# Patient Record
(demographics unavailable — no encounter records)

---

## 2025-01-16 NOTE — PHYSICAL EXAM
[NL] : warm, clear [de-identified] : no swelling, bruising or deformities noted to lower back at midline, pain reported on spinal flexion and extension

## 2025-01-16 NOTE — HISTORY OF PRESENT ILLNESS
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

## 2025-05-13 NOTE — PHYSICAL EXAM
[Regular Rate and Rhythm] : regular rate and rhythm [Capillary Refill <2s] : capillary refill < 2s [NL] : warm, clear [Murmur] : no murmur [de-identified] : no thyromegaly  [FreeTextEntry8] : repeat HR with pt lying 85 bpm

## 2025-05-13 NOTE — DISCUSSION/SUMMARY
[FreeTextEntry1] : 19 y/o F with reported hx of tachycardia. Pt hemodynamically stable. Recommend eval by cardiology for EKG and evaluation.  Encourage hydration  Seek care emergently if with syncope, chest pain, dizziness, worsening of symptoms

## 2025-05-13 NOTE — HISTORY OF PRESENT ILLNESS
[FreeTextEntry6] : 21 y/o presenting for concerns of increased HR. Notes for last 1-2 weeks having episodes of high heart rate lasting for several minutes. Typically happening at rest, have not noted symptoms when exercising. On pulse ox at home, HR will read between 120-130s. States some SOB w/episodes, denies chest pain, syncope, or dizziness w/episodes. Denies decreased exercise tolerance. No recent medication changes. Denies episodes occurring w/anxiety but states anxiety is well controlled. Denies diaphoresis, temperature intolerance, or symptoms of jitteriness. Denies substance use other than weekly alcohol use. Has abstained from caffeine but no difference in symptoms. Denies family hx of cardiac disease or arrhythmia.   Pt with cardio visit 7/2022 with normal EKG, ECHO with borderline mitral valve.

## 2025-05-14 NOTE — DISCUSSION/SUMMARY
[Patient] : the patient [Parent] : the parent [With Me] : with me [___ Week(s)] : in [unfilled] week(s) [EKG obtained to assist in diagnosis and management of assessed problem(s)] : EKG obtained to assist in diagnosis and management of assessed problem(s)

## 2025-05-14 NOTE — HISTORY OF PRESENT ILLNESS
[Alert] : alert [Oriented x 3] : ~L oriented x 3 [FreeTextEntry1] : 20 year old previously followed with pediatric cardiologist, Dr. Hamilton Chirinos, last seen 7/14/22 following episodes of near syncope which were deemed vagal in mechanism. She reports experiencing recurrent rapid palpitations at rest usually lasting seconds to minutes. Symptoms are associated with dyspnea. Exercises regularly without symptoms or difficulty.  [Well Nourished] : well nourished [Conjunctiva Non-injected] : conjunctiva non-injected [No Visual Lymphadenopathy] : no visual  lymphadenopathy [No Clubbing] : no clubbing [No Edema] : no edema [No Bromhidrosis] : no bromhidrosis [No Chromhidrosis] : no chromhidrosis [Full Body Skin Exam Performed] : performed [FreeTextEntry3] : General: Alert and oriented, in NAD.  All of the following were examined and were within normal limits, except as noted:   Scalp: Face, including eyelids, nose, lips, ears, oropharynx: Neck: Chest/Back/Abdomen: Bilateral Arms/Hands: Bilateral Legs/Feet: Buttocks, Genitalia, Anus/perineum:  	 Hair, Oral Mucosa, Eyes:   Nails not examined due to polish rhytides static and dynamic on forehead, glabella, perioral. heavy upper lids stuckon waxy brown papules on body cherry red papules inflamed SKs on R hairline x1, R temple x1, R cheek x1

## 2025-06-13 NOTE — HISTORY OF PRESENT ILLNESS
[FreeTextEntry1] : 20 year old reports occasional extra beat and short bursts of rapid palpitations. Symptoms are mild and primarily noticed at rest.